# Patient Record
Sex: FEMALE | Race: WHITE | NOT HISPANIC OR LATINO | Employment: FULL TIME | URBAN - METROPOLITAN AREA
[De-identification: names, ages, dates, MRNs, and addresses within clinical notes are randomized per-mention and may not be internally consistent; named-entity substitution may affect disease eponyms.]

---

## 2023-05-05 LAB
ESTRADIOL (PG/ML) IN SER/PLAS: 70 PG/ML
FOLLITROPIN (IU/L) IN SER/PLAS: 22.6 IU/L
LUTEINIZING HORMONE (IU/ML) IN SER/PLAS: 15.8 IU/L

## 2023-11-07 LAB
GENETICS TEST NAME-DATA CONVERSION: NORMAL
LAB MOLECULAR CA TECHNICAL NOTES: NORMAL

## 2024-01-19 ENCOUNTER — OFFICE VISIT (OUTPATIENT)
Dept: OBSTETRICS AND GYNECOLOGY | Facility: CLINIC | Age: 44
End: 2024-01-19
Payer: COMMERCIAL

## 2024-01-19 VITALS
BODY MASS INDEX: 28.89 KG/M2 | SYSTOLIC BLOOD PRESSURE: 130 MMHG | WEIGHT: 157 LBS | DIASTOLIC BLOOD PRESSURE: 90 MMHG | HEIGHT: 62 IN

## 2024-01-19 DIAGNOSIS — N90.4 LICHEN SCLEROSUS OF VULVA: ICD-10-CM

## 2024-01-19 DIAGNOSIS — N89.8 VAGINAL DISCHARGE: ICD-10-CM

## 2024-01-19 DIAGNOSIS — Z11.3 SCREEN FOR STD (SEXUALLY TRANSMITTED DISEASE): ICD-10-CM

## 2024-01-19 DIAGNOSIS — N76.4 ABSCESS, VULVA: Primary | ICD-10-CM

## 2024-01-19 DIAGNOSIS — A74.9 CHLAMYDIA INFECTION: ICD-10-CM

## 2024-01-19 PROCEDURE — 87591 N.GONORRHOEAE DNA AMP PROB: CPT

## 2024-01-19 PROCEDURE — 87075 CULTR BACTERIA EXCEPT BLOOD: CPT

## 2024-01-19 PROCEDURE — 1036F TOBACCO NON-USER: CPT | Performed by: OBSTETRICS & GYNECOLOGY

## 2024-01-19 PROCEDURE — 87070 CULTURE OTHR SPECIMN AEROBIC: CPT

## 2024-01-19 PROCEDURE — 87205 SMEAR GRAM STAIN: CPT

## 2024-01-19 PROCEDURE — 87185 SC STD ENZYME DETCJ PER NZM: CPT

## 2024-01-19 PROCEDURE — 99214 OFFICE O/P EST MOD 30 MIN: CPT | Performed by: OBSTETRICS & GYNECOLOGY

## 2024-01-19 PROCEDURE — 87661 TRICHOMONAS VAGINALIS AMPLIF: CPT

## 2024-01-19 PROCEDURE — 87491 CHLMYD TRACH DNA AMP PROBE: CPT

## 2024-01-19 RX ORDER — GABAPENTIN 300 MG/1
300 CAPSULE ORAL NIGHTLY
COMMUNITY

## 2024-01-19 RX ORDER — DOXYCYCLINE 100 MG/1
100 CAPSULE ORAL 2 TIMES DAILY
Qty: 14 CAPSULE | Refills: 0 | Status: SHIPPED | OUTPATIENT
Start: 2024-01-19 | End: 2024-01-26

## 2024-01-19 RX ORDER — VENLAFAXINE HYDROCHLORIDE 37.5 MG/1
37.5 CAPSULE, EXTENDED RELEASE ORAL
COMMUNITY
Start: 2022-09-26

## 2024-01-19 RX ORDER — CLOBETASOL PROPIONATE 0.5 MG/G
CREAM TOPICAL
COMMUNITY
Start: 2021-08-13

## 2024-01-19 RX ORDER — CLONAZEPAM 0.5 MG/1
0.5 TABLET ORAL EVERY 12 HOURS PRN
COMMUNITY
Start: 2017-07-05

## 2024-01-19 NOTE — PROGRESS NOTES
"SUBJECTIVE  Rita Hayes is a 43 y.o. female here for Gyn problem visit  C/o discharge from the clitoris for 2 weeks, also c/o pain in the area for 3 days  H/o endometrial ablation, stopped periods  Has H/o lichen sclerosus, using clobetasol sparingly    Gyn:   Menstrual Pattern: none  STIs: denies  Sexual Activity: men, monogamous, no complaints  Contraception: Tubal Sterilization    [unfilled]  No past medical history on file.   Past Surgical History:   Procedure Laterality Date    OTHER SURGICAL HISTORY  11/18/2019    Endometrial ablation    OTHER SURGICAL HISTORY  08/13/2021    [place] tooth extraction    OTHER SURGICAL HISTORY  08/13/2021    Oophorectomy    TUBAL LIGATION  10/27/2015    Tubal Ligation        OBJECTIVE  /90   Ht 1.575 m (5' 2\")   Wt 71.2 kg (157 lb)   BMI 28.72 kg/m²      General:   Alert and oriented, in no acute distress   Neck: Supple. No visible thyromegaly.    Breast/Axilla: Normal to palpation bilaterally without masses, skin changes, or nipple discharge.    Abdomen: Soft, non-tender, without masses or organomegaly   Vulva: Normal architecture without erythema, masses, or lesions.    Vagina: Normal mucosa without lesions, masses, or atrophy. No abnormal vaginal discharge.    Cervix: Normal without masses, lesions, or signs of cervicitis.    Uterus: Normal mobile, non-enlarged uterus    Adnexa: Normal without masses or lesions   Pelvic Floor No POP noted. No high tone pelvic floor    Psych Normal affect. Normal mood.      Problem List Items Addressed This Visit    None     IMPRESSIONS:  Vulvar abscess, spontaneously draining, cultures ordered  Start doxycycline , erx done  Continue clobetasol   Schedule annual exam.    There are no diagnoses linked to this encounter.     Uyen Haines MD  "

## 2024-01-20 LAB
C TRACH RRNA SPEC QL NAA+PROBE: POSITIVE
N GONORRHOEA DNA SPEC QL PROBE+SIG AMP: NEGATIVE
T VAGINALIS RRNA SPEC QL NAA+PROBE: NEGATIVE

## 2024-01-21 LAB
CLUE CELLS VAG LPF-#/AREA: NORMAL /[LPF]
NUGENT SCORE: 1
YEAST VAG WET PREP-#/AREA: NORMAL

## 2024-01-22 ENCOUNTER — TELEPHONE (OUTPATIENT)
Dept: OBSTETRICS AND GYNECOLOGY | Facility: CLINIC | Age: 44
End: 2024-01-22
Payer: COMMERCIAL

## 2024-01-22 DIAGNOSIS — A74.9 CHLAMYDIA INFECTION: ICD-10-CM

## 2024-01-22 LAB
B-LACTAMASE ORGANISM ISLT: POSITIVE
BACTERIA SPEC CULT: NORMAL
BACTERIA SPEC CULT: NORMAL
GRAM STN SPEC: NORMAL
GRAM STN SPEC: NORMAL

## 2024-01-22 NOTE — TELEPHONE ENCOUNTER
Dr. Haines prescribed doxycycline 100MG for patient, she states she is unable to keep it down, she is asking if a new medication can be sent over to [facility]?

## 2024-01-23 ENCOUNTER — TELEPHONE (OUTPATIENT)
Dept: OBSTETRICS AND GYNECOLOGY | Facility: CLINIC | Age: 44
End: 2024-01-23
Payer: COMMERCIAL

## 2024-01-23 DIAGNOSIS — A74.9 CHLAMYDIA INFECTION: ICD-10-CM

## 2024-01-23 RX ORDER — AZITHROMYCIN 500 MG/1
1000 TABLET, FILM COATED ORAL ONCE
Qty: 2 TABLET | Refills: 0 | Status: SHIPPED | OUTPATIENT
Start: 2024-01-23 | End: 2024-01-23

## 2024-01-23 NOTE — TELEPHONE ENCOUNTER
----- Message from Uyen Haines MD sent at 1/22/2024  4:57 PM EST -----  Please send in zithromax 1000 mg x single dose, partner needs treated  ----- Message -----  From: Lab, Background User  Sent: 1/20/2024  12:02 PM EST  To: Uyen Haines MD

## 2024-01-23 NOTE — TELEPHONE ENCOUNTER
Called into pharmacy. Patient called and notified. Patient will call office tomorrow to schedule 6 week DIOGENES appointment.

## 2024-01-31 ENCOUNTER — TELEPHONE (OUTPATIENT)
Dept: OBSTETRICS AND GYNECOLOGY | Facility: CLINIC | Age: 44
End: 2024-01-31
Payer: COMMERCIAL

## 2024-01-31 NOTE — TELEPHONE ENCOUNTER
Called patient and advised that Dr. Haines would want to do a 6 week test of cure. Also advised to refrain from intercourse until 1 week after patient and partner have been fully treated. Patient is going to check with PCP for an appointment and/or will call back to OBGYN office for an appointment

## 2024-01-31 NOTE — TELEPHONE ENCOUNTER
Patient had STD testing 2 weeks ago and was given a Z-alessandro. She wants retested before she has intercourse again.  Please advise.  She is very confused about all of this.      She is working if you want to leave a message it is totally fine.

## 2024-02-12 ENCOUNTER — OFFICE VISIT (OUTPATIENT)
Dept: OBSTETRICS AND GYNECOLOGY | Facility: CLINIC | Age: 44
End: 2024-02-12
Payer: COMMERCIAL

## 2024-02-12 ENCOUNTER — TELEPHONE (OUTPATIENT)
Dept: OBSTETRICS AND GYNECOLOGY | Facility: CLINIC | Age: 44
End: 2024-02-12
Payer: COMMERCIAL

## 2024-02-12 VITALS — SYSTOLIC BLOOD PRESSURE: 118 MMHG | BODY MASS INDEX: 28.53 KG/M2 | WEIGHT: 156 LBS | DIASTOLIC BLOOD PRESSURE: 84 MMHG

## 2024-02-12 DIAGNOSIS — N76.4 ABSCESS, VULVA: Primary | ICD-10-CM

## 2024-02-12 DIAGNOSIS — N90.4 LICHEN SCLEROSUS OF VULVA: ICD-10-CM

## 2024-02-12 DIAGNOSIS — Z11.3 SCREEN FOR STD (SEXUALLY TRANSMITTED DISEASE): ICD-10-CM

## 2024-02-12 DIAGNOSIS — N89.8 VAGINAL DISCHARGE: ICD-10-CM

## 2024-02-12 PROCEDURE — 87591 N.GONORRHOEAE DNA AMP PROB: CPT

## 2024-02-12 PROCEDURE — 87491 CHLMYD TRACH DNA AMP PROBE: CPT

## 2024-02-12 PROCEDURE — 1036F TOBACCO NON-USER: CPT | Performed by: OBSTETRICS & GYNECOLOGY

## 2024-02-12 PROCEDURE — 99214 OFFICE O/P EST MOD 30 MIN: CPT | Performed by: OBSTETRICS & GYNECOLOGY

## 2024-02-12 PROCEDURE — 87070 CULTURE OTHR SPECIMN AEROBIC: CPT

## 2024-02-12 PROCEDURE — 87661 TRICHOMONAS VAGINALIS AMPLIF: CPT

## 2024-02-12 RX ORDER — DOXYCYCLINE 100 MG/1
100 CAPSULE ORAL 2 TIMES DAILY
Qty: 28 CAPSULE | Refills: 0 | Status: SHIPPED | OUTPATIENT
Start: 2024-02-12 | End: 2024-02-26

## 2024-02-12 RX ORDER — PHENTERMINE HYDROCHLORIDE 37.5 MG/1
37.5 TABLET ORAL
COMMUNITY
Start: 2024-01-23 | End: 2024-03-25

## 2024-02-12 NOTE — TELEPHONE ENCOUNTER
Patient called wanting another apptointment because she is having the same thing she had when she was here the end of January.  She had a abscess on the outside of her vagina and TB drained it.  She now has another one in the same exact spot and it is bleeding.  Please advise.    Please leave a message on her phone if no answer.

## 2024-02-12 NOTE — TELEPHONE ENCOUNTER
Spoke with patient and patient agrees to same day appointment today. Will be added to schedule today at 1:40.

## 2024-02-12 NOTE — PROGRESS NOTES
SUBJECTIVE  Rita Hayes is a 43 y.o. female here for gyn problem visit  She was treated for vulvar chlamydia infection one month ago  She c/o discharge and bleeding from the same area from an opening above her clitoris  Gyn:   Menstrual Pattern:   STIs: denies  Sexual Activity: men, monogamous, no complaints  Contraception: None    [unfilled]  No past medical history on file.   Past Surgical History:   Procedure Laterality Date    OTHER SURGICAL HISTORY  11/18/2019    Endometrial ablation    OTHER SURGICAL HISTORY  08/13/2021    [place] tooth extraction    OTHER SURGICAL HISTORY  08/13/2021    Oophorectomy    TUBAL LIGATION  10/27/2015    Tubal Ligation        OBJECTIVE  /84   Wt 70.8 kg (156 lb)   BMI 28.53 kg/m²      General:   Alert and oriented, in no acute distress   Neck: Supple. No visible thyromegaly.    Breast/Axilla: Normal to palpation bilaterally without masses, skin changes, or nipple discharge.    Abdomen: Soft, non-tender, without masses or organomegaly   Vulva: Normal architecture without erythema, labial agglutination noted in the clitoral area, with pus discharge from area 1 cm above the clitoris,    Vagina: Normal mucosa without lesions, masses, or atrophy. No abnormal vaginal discharge.    Cervix: Normal without masses, lesions, or signs of cervicitis.    Uterus: Normal mobile, non-enlarged uterus    Adnexa: Normal without masses or lesions   Pelvic Floor No POP noted. No high tone pelvic floor    Psych Normal affect. Normal mood.      Problem List Items Addressed This Visit       Abscess, vulva - Primary    Relevant Medications    doxycycline (Vibramycin) 100 mg capsule    Other Relevant Orders    Genital Culture    Lichen sclerosus of vulva    Vaginal discharge     Other Visit Diagnoses       Screen for STD (sexually transmitted disease)        Relevant Orders    Chlamydia Trachomatis, Amplified    Neisseria gonorrhoeae, Amplified    Trichomonas vaginalis, Nucleic Acid Detection            IMPRESSIONS:  Recurrent Vulvar abscess  Culture and sensitivity ordered again  Cervical GC/CT/trichomonas ordered  Will treat with doxycycline 100 mg BID x 14 days  Discussed that if there is no relief after antibiotic therapy, with perform vulvar biopsy to r/o neoplasia  F/u in 4 weeks        Uyen Haines MD

## 2024-02-13 LAB
C TRACH RRNA SPEC QL NAA+PROBE: NEGATIVE
N GONORRHOEA DNA SPEC QL PROBE+SIG AMP: NEGATIVE
T VAGINALIS RRNA SPEC QL NAA+PROBE: NEGATIVE

## 2024-02-16 LAB — BACTERIA GENITAL AEROBE CULT: NORMAL

## 2024-03-22 NOTE — PROGRESS NOTES
SUBJECTIVE  Rita Hayes is a 43 y.o. female here for vulvar biopsy  History of vulvar chlamydia infection, which has resolved now  She also has a history of vulvar lichen sclerosus with labial agglutination, and chronic discharge from small opening above the clitoral area.   She is using now clobetasol daily     Menstrual Pattern:   STIs: denies  Sexual Activity: men, monogamous, no complaints  Contraception: None    [unfilled]  No past medical history on file.   Past Surgical History:   Procedure Laterality Date    OTHER SURGICAL HISTORY  11/18/2019    Endometrial ablation    OTHER SURGICAL HISTORY  08/13/2021    [place] tooth extraction    OTHER SURGICAL HISTORY  08/13/2021    Oophorectomy    TUBAL LIGATION  10/27/2015    Tubal Ligation        OBJECTIVE  /90   Wt 68 kg (150 lb)   Breastfeeding No   BMI 27.44 kg/m²      General:   Alert and oriented, in no acute distress   Neck: Supple. No visible thyromegaly.    Vulva: Atrophic labia majora and minora, with labial agglutination and clitoris buried within. At cranial end of labial agglutination, about 1 cm above the clitoris , small pin - point opening still noted with small amount of discharge.   Pelvic Floor No POP noted. No high tone pelvic floor    Psych Normal affect. Normal mood.      Problem List Items Addressed This Visit       Abscess, vulva    Relevant Orders    Surgical Pathology Exam    Lichen sclerosus of vulva    Relevant Orders    Surgical Pathology Exam    Vulvar atrophy - Primary      IMPRESSIONS:  Patient presented for vulvar biopsy due to chronic labial abscess 1 cm above the clitoral area.  Vulvar biopsy was completed, and silver nitrate and Monsel's paste was applied for hemostasis.  After completion of the procedure patient complained of dizziness and nausea and had a syncopal episode, with continuing dizziness and disorientation.  EMS was called and patient was sent to [place] ER for further evaluation  Procedure note to  follow        Uyen Haines MD

## 2024-03-25 ENCOUNTER — APPOINTMENT (OUTPATIENT)
Dept: CARDIOLOGY | Facility: HOSPITAL | Age: 44
End: 2024-03-25
Payer: COMMERCIAL

## 2024-03-25 ENCOUNTER — APPOINTMENT (OUTPATIENT)
Dept: RADIOLOGY | Facility: HOSPITAL | Age: 44
End: 2024-03-25
Payer: COMMERCIAL

## 2024-03-25 ENCOUNTER — HOSPITAL ENCOUNTER (EMERGENCY)
Facility: HOSPITAL | Age: 44
Discharge: HOME | End: 2024-03-25
Payer: COMMERCIAL

## 2024-03-25 ENCOUNTER — OFFICE VISIT (OUTPATIENT)
Dept: OBSTETRICS AND GYNECOLOGY | Facility: CLINIC | Age: 44
End: 2024-03-25
Payer: COMMERCIAL

## 2024-03-25 VITALS
DIASTOLIC BLOOD PRESSURE: 86 MMHG | OXYGEN SATURATION: 98 % | HEART RATE: 86 BPM | TEMPERATURE: 98.4 F | HEIGHT: 66 IN | RESPIRATION RATE: 18 BRPM | WEIGHT: 149.91 LBS | SYSTOLIC BLOOD PRESSURE: 131 MMHG | BODY MASS INDEX: 24.09 KG/M2

## 2024-03-25 VITALS — SYSTOLIC BLOOD PRESSURE: 130 MMHG | WEIGHT: 150 LBS | DIASTOLIC BLOOD PRESSURE: 90 MMHG | BODY MASS INDEX: 27.44 KG/M2

## 2024-03-25 DIAGNOSIS — R55 SYNCOPE, UNSPECIFIED SYNCOPE TYPE: Primary | ICD-10-CM

## 2024-03-25 DIAGNOSIS — N90.4 LICHEN SCLEROSUS OF VULVA: ICD-10-CM

## 2024-03-25 DIAGNOSIS — N90.5 VULVAR ATROPHY: Primary | ICD-10-CM

## 2024-03-25 DIAGNOSIS — N76.4 ABSCESS, VULVA: ICD-10-CM

## 2024-03-25 PROBLEM — N89.8 VAGINAL DISCHARGE: Status: RESOLVED | Noted: 2024-02-12 | Resolved: 2024-03-25

## 2024-03-25 LAB
ALBUMIN SERPL BCP-MCNC: 4.2 G/DL (ref 3.4–5)
ALP SERPL-CCNC: 64 U/L (ref 33–110)
ALT SERPL W P-5'-P-CCNC: 10 U/L (ref 7–45)
ANION GAP SERPL CALC-SCNC: 11 MMOL/L (ref 10–20)
APPEARANCE UR: CLEAR
AST SERPL W P-5'-P-CCNC: 14 U/L (ref 9–39)
BACTERIA #/AREA URNS AUTO: ABNORMAL /HPF
BASOPHILS # BLD AUTO: 0.07 X10*3/UL (ref 0–0.1)
BASOPHILS NFR BLD AUTO: 0.9 %
BILIRUB SERPL-MCNC: 0.7 MG/DL (ref 0–1.2)
BILIRUB UR STRIP.AUTO-MCNC: NEGATIVE MG/DL
BUN SERPL-MCNC: 10 MG/DL (ref 6–23)
CALCIUM SERPL-MCNC: 9.3 MG/DL (ref 8.6–10.3)
CARDIAC TROPONIN I PNL SERPL HS: 3 NG/L (ref 0–13)
CHLORIDE SERPL-SCNC: 104 MMOL/L (ref 98–107)
CO2 SERPL-SCNC: 27 MMOL/L (ref 21–32)
COLOR UR: ABNORMAL
CREAT SERPL-MCNC: 0.76 MG/DL (ref 0.5–1.05)
EGFRCR SERPLBLD CKD-EPI 2021: >90 ML/MIN/1.73M*2
EOSINOPHIL # BLD AUTO: 0.15 X10*3/UL (ref 0–0.7)
EOSINOPHIL NFR BLD AUTO: 2 %
ERYTHROCYTE [DISTWIDTH] IN BLOOD BY AUTOMATED COUNT: 12.5 % (ref 11.5–14.5)
GLUCOSE SERPL-MCNC: 88 MG/DL (ref 74–99)
GLUCOSE UR STRIP.AUTO-MCNC: NEGATIVE MG/DL
HCT VFR BLD AUTO: 42.5 % (ref 36–46)
HGB BLD-MCNC: 13.8 G/DL (ref 12–16)
IMM GRANULOCYTES # BLD AUTO: 0.02 X10*3/UL (ref 0–0.7)
IMM GRANULOCYTES NFR BLD AUTO: 0.3 % (ref 0–0.9)
KETONES UR STRIP.AUTO-MCNC: ABNORMAL MG/DL
LEUKOCYTE ESTERASE UR QL STRIP.AUTO: NEGATIVE
LYMPHOCYTES # BLD AUTO: 2.02 X10*3/UL (ref 1.2–4.8)
LYMPHOCYTES NFR BLD AUTO: 26.6 %
MCH RBC QN AUTO: 25.9 PG (ref 26–34)
MCHC RBC AUTO-ENTMCNC: 32.5 G/DL (ref 32–36)
MCV RBC AUTO: 80 FL (ref 80–100)
MONOCYTES # BLD AUTO: 0.39 X10*3/UL (ref 0.1–1)
MONOCYTES NFR BLD AUTO: 5.1 %
NEUTROPHILS # BLD AUTO: 4.93 X10*3/UL (ref 1.2–7.7)
NEUTROPHILS NFR BLD AUTO: 65.1 %
NITRITE UR QL STRIP.AUTO: NEGATIVE
NRBC BLD-RTO: 0 /100 WBCS (ref 0–0)
PH UR STRIP.AUTO: 7 [PH]
PLATELET # BLD AUTO: 285 X10*3/UL (ref 150–450)
POTASSIUM SERPL-SCNC: 3.8 MMOL/L (ref 3.5–5.3)
PROT SERPL-MCNC: 7.1 G/DL (ref 6.4–8.2)
PROT UR STRIP.AUTO-MCNC: NEGATIVE MG/DL
RBC # BLD AUTO: 5.33 X10*6/UL (ref 4–5.2)
RBC # UR STRIP.AUTO: ABNORMAL /UL
RBC #/AREA URNS AUTO: ABNORMAL /HPF
SODIUM SERPL-SCNC: 138 MMOL/L (ref 136–145)
SP GR UR STRIP.AUTO: 1
SQUAMOUS #/AREA URNS AUTO: ABNORMAL /HPF
UROBILINOGEN UR STRIP.AUTO-MCNC: <2 MG/DL
WBC # BLD AUTO: 7.6 X10*3/UL (ref 4.4–11.3)
WBC #/AREA URNS AUTO: ABNORMAL /HPF

## 2024-03-25 PROCEDURE — 71046 X-RAY EXAM CHEST 2 VIEWS: CPT

## 2024-03-25 PROCEDURE — 96361 HYDRATE IV INFUSION ADD-ON: CPT

## 2024-03-25 PROCEDURE — 85025 COMPLETE CBC W/AUTO DIFF WBC: CPT | Performed by: NURSE PRACTITIONER

## 2024-03-25 PROCEDURE — 71046 X-RAY EXAM CHEST 2 VIEWS: CPT | Performed by: RADIOLOGY

## 2024-03-25 PROCEDURE — 56605 BIOPSY OF VULVA/PERINEUM: CPT | Performed by: OBSTETRICS & GYNECOLOGY

## 2024-03-25 PROCEDURE — 36415 COLL VENOUS BLD VENIPUNCTURE: CPT | Performed by: NURSE PRACTITIONER

## 2024-03-25 PROCEDURE — 2500000001 HC RX 250 WO HCPCS SELF ADMINISTERED DRUGS (ALT 637 FOR MEDICARE OP): Performed by: NURSE PRACTITIONER

## 2024-03-25 PROCEDURE — 88305 TISSUE EXAM BY PATHOLOGIST: CPT

## 2024-03-25 PROCEDURE — 93005 ELECTROCARDIOGRAM TRACING: CPT

## 2024-03-25 PROCEDURE — 2500000004 HC RX 250 GENERAL PHARMACY W/ HCPCS (ALT 636 FOR OP/ED): Performed by: NURSE PRACTITIONER

## 2024-03-25 PROCEDURE — 96374 THER/PROPH/DIAG INJ IV PUSH: CPT

## 2024-03-25 PROCEDURE — 56606 BIOPSY OF VULVA/PERINEUM: CPT | Performed by: OBSTETRICS & GYNECOLOGY

## 2024-03-25 PROCEDURE — 80053 COMPREHEN METABOLIC PANEL: CPT | Performed by: NURSE PRACTITIONER

## 2024-03-25 PROCEDURE — 88305 TISSUE EXAM BY PATHOLOGIST: CPT | Performed by: STUDENT IN AN ORGANIZED HEALTH CARE EDUCATION/TRAINING PROGRAM

## 2024-03-25 PROCEDURE — 99214 OFFICE O/P EST MOD 30 MIN: CPT | Performed by: OBSTETRICS & GYNECOLOGY

## 2024-03-25 PROCEDURE — 84484 ASSAY OF TROPONIN QUANT: CPT | Performed by: NURSE PRACTITIONER

## 2024-03-25 PROCEDURE — 1036F TOBACCO NON-USER: CPT | Performed by: OBSTETRICS & GYNECOLOGY

## 2024-03-25 PROCEDURE — 81001 URINALYSIS AUTO W/SCOPE: CPT | Performed by: NURSE PRACTITIONER

## 2024-03-25 PROCEDURE — 99284 EMERGENCY DEPT VISIT MOD MDM: CPT | Mod: 25

## 2024-03-25 RX ORDER — KETOROLAC TROMETHAMINE 30 MG/ML
15 INJECTION, SOLUTION INTRAMUSCULAR; INTRAVENOUS ONCE
Status: COMPLETED | OUTPATIENT
Start: 2024-03-25 | End: 2024-03-25

## 2024-03-25 RX ORDER — OXYCODONE AND ACETAMINOPHEN 5; 325 MG/1; MG/1
1 TABLET ORAL ONCE
Status: COMPLETED | OUTPATIENT
Start: 2024-03-25 | End: 2024-03-25

## 2024-03-25 RX ADMIN — SODIUM CHLORIDE 1000 ML: 9 INJECTION, SOLUTION INTRAVENOUS at 13:03

## 2024-03-25 RX ADMIN — OXYCODONE HYDROCHLORIDE AND ACETAMINOPHEN 1 TABLET: 5; 325 TABLET ORAL at 14:37

## 2024-03-25 RX ADMIN — KETOROLAC TROMETHAMINE 15 MG: 30 INJECTION, SOLUTION INTRAMUSCULAR; INTRAVENOUS at 13:04

## 2024-03-25 ASSESSMENT — PAIN DESCRIPTION - LOCATION: LOCATION: VAGINA

## 2024-03-25 ASSESSMENT — LIFESTYLE VARIABLES
HAVE YOU EVER FELT YOU SHOULD CUT DOWN ON YOUR DRINKING: NO
EVER HAD A DRINK FIRST THING IN THE MORNING TO STEADY YOUR NERVES TO GET RID OF A HANGOVER: NO
HAVE PEOPLE ANNOYED YOU BY CRITICIZING YOUR DRINKING: NO
TOTAL SCORE: 0
EVER FELT BAD OR GUILTY ABOUT YOUR DRINKING: NO

## 2024-03-25 ASSESSMENT — PAIN SCALES - GENERAL
PAINLEVEL_OUTOF10: 10 - WORST POSSIBLE PAIN
PAINLEVEL_OUTOF10: 3

## 2024-03-25 ASSESSMENT — PAIN - FUNCTIONAL ASSESSMENT: PAIN_FUNCTIONAL_ASSESSMENT: 0-10

## 2024-03-25 ASSESSMENT — COLUMBIA-SUICIDE SEVERITY RATING SCALE - C-SSRS
1. IN THE PAST MONTH, HAVE YOU WISHED YOU WERE DEAD OR WISHED YOU COULD GO TO SLEEP AND NOT WAKE UP?: NO
6. HAVE YOU EVER DONE ANYTHING, STARTED TO DO ANYTHING, OR PREPARED TO DO ANYTHING TO END YOUR LIFE?: NO
2. HAVE YOU ACTUALLY HAD ANY THOUGHTS OF KILLING YOURSELF?: NO

## 2024-03-25 ASSESSMENT — PAIN DESCRIPTION - PAIN TYPE: TYPE: ACUTE PAIN

## 2024-03-25 NOTE — PROGRESS NOTES
Patient ID: Rita Hayes is a 43 y.o. female, here for vulvar biopsy    Biopsy vulva    Date/Time: 3/25/2024 12:47 PM    Performed by: Uyen Haines MD  Authorized by: Uyen Haines MD    Consent:     Consent obtained:  Written    Consent given by:  Patient    Risks, benefits, and alternatives were discussed: yes      Risks discussed:  Bleeding, infection and pain    Alternatives discussed:  Observation and referral  Universal protocol:     Procedure explained and questions answered to patient or proxy's satisfaction: yes      Relevant documents present and verified: yes      Site/side marked: yes      Immediately prior to procedure, a time out was called: yes      Patient identity confirmed:  Verbally with patient  Indications:     Indications:  History of lichen sclerosus with labial agglutination and chronic vulvar abscess  Pre-procedure details:     Skin preparation:  Povidone-iodine    Preparation: Patient was prepped and draped in the usual sterile fashion    Sedation:     Sedation type:  None  Anesthesia:     Anesthesia method:  Local infiltration    Local anesthetic:  Lidocaine 1% w/o epi  Procedure specific details:      Patient was placed in dorsolithotomy position, prepped and draped in the usual sterile fashion and 3 mL of 1% lidocaine without epinephrine was infiltrated into the area 0.5 cm above the clitoris and below the opening of the chronic labial abscess area.  Scalpel was used to make a small vertical incision just below the area of abscess and just over the area of agglutination, to expose the buried area.  Next using 3 mm punch biopsy tool, biopsy was performed and sent to pathology.  Please bleeding was noted from the area.  Silver nitrate was applied for hemostasis but this was very poorly tolerated by patient.  Next Monsel's Paste was applied on the area for hemostasis.  Post-procedure details:     Procedure completion:  Tolerated with difficulty  After application of Monsel's paste  patient complained of lightheadedness and dizziness and disorientation and had a syncopal episode.  She was laid down and vitals were checked and were cold wash cloth was applied to her forehead.  Patient also complained of nausea and it was turned to the side in case of emesis.  Patient was monitored for 10 to 15 minutes but remained dizzy and disoriented.  At this point of time EMS was called to transfer patient to ER for further evaluation.

## 2024-03-25 NOTE — ED PROVIDER NOTES
"HPI   Chief Complaint   Patient presents with    Syncope       This is a 43-year-old  female with a history of anxiety, depression, and lichen sclerosus, that is presenting to the emergency room status post syncopal event.  The patient reports that she was having a biopsy performed by Dr. Haines for Lichen sclerosus.  She reports that her genitals were anesthetized but she can still feel the entire procedure.  They performed a biopsy near her clitoris.  She reports that she applied something to that area because it was \"very vascular and prevent bleeding \".  The patient reported that she felt intense pain in the last thing she remembers was screaming.  She woke up with EMS.  She reports that she still having a lot of pain.  She rates it a 10 out of 10 on the pain scale.  The patient denies any cardiac history or prior syncope.  She denies any chest pain, shortness of breath, dizziness, palpitations, paresthesias, headache, or focal weakness.  She not having abdominal pain, nausea, or vomiting.  The patient reports that she is on venlafaxine and phentermine routinely.  She denies any blood thinners.  She has no other medical complaints.      History provided by:  Patient   used: No                        Alfredo Coma Scale Score: 15                     Patient History   No past medical history on file.  Past Surgical History:   Procedure Laterality Date    OTHER SURGICAL HISTORY  11/18/2019    Endometrial ablation    OTHER SURGICAL HISTORY  08/13/2021    [place] tooth extraction    OTHER SURGICAL HISTORY  08/13/2021    Oophorectomy    TUBAL LIGATION  10/27/2015    Tubal Ligation     Family History   Problem Relation Name Age of Onset    Hypertension Mother      Heart disease Mother      Uterine cancer Sister      Ovarian cancer Maternal Grandmother       Social History     Tobacco Use    Smoking status: Former     Types: Cigarettes    Smokeless tobacco: Never   Vaping Use    Vaping Use: " Former   Substance Use Topics    Alcohol use: Yes     Comment: social    Drug use: Never       Physical Exam   ED Triage Vitals [03/25/24 1057]   Temperature Heart Rate Respirations BP   36.9 °C (98.4 °F) 68 20 144/75      Pulse Ox Temp src Heart Rate Source Patient Position   100 % -- Monitor Lying      BP Location FiO2 (%)     -- --       Physical Exam    ED Course & [facility]   ED Course as of 03/27/24 2018   Mon Mar 25, 2024   1330 ECG 12 lead  Twelve-lead EKG interpreted by me.  Sinus rhythm at a rate of 77.  WA interval is 139, QRS is 96, QT is 366, QTc is 415.  Normal axis.  Normal interval.  No acute ischemia or injury pattern noted. [CT]      ED Course User Index  [CT] LESLY Ortiz-CNP         Diagnoses as of 03/27/24 2018   Syncope, unspecified syncope type       Medical Decision Making  The patient was seen and evaluated by the nurse practitioner, Jania Dallas.  Old records were obtained and reviewed.  It was noted that the patient was seen for chronic labial abscess.  Incision and drainage was performed.  Silver nitrate and Monsel's paste was applied to obtain hemostasis.  Once the procedure was completed the patient had the syncopal episode.  She was sent to the emergency room by Dr. Haines due to continued confusion and disorientation.  The patient was alert and oriented on assessment.  The patient was placed on cardiac monitor and continuous pulse oximetry.  Orthostatic blood pressures were obtained and were unremarkable.  A saline lock was established and laboratory studies were drawn with results as noted.  The patient was administered 1 L of normal saline wide open for hydration and Toradol 15 mg IVP.  The patient did not report any improvement of her pain symptoms.  She was further medicated with 1 Percocet tab p.o. in the emergency room.  The patient's laboratory studies were largely unremarkable.  The patient did not have any acute leukocytosis.  She had normal renal and liver function.  Troponin was negative.  Routine urinalysis did not show any signs of infection.  We consulted with Dr. Haines and she was agreeable with discharge home based on the patient's unremarkable workup.  We believe that the patient syncopized most likely due to pain.  The patient was notified of her laboratory results.  The patient is feeling improvement.  She is agreeable with discharge home.  The patient is to return if worse in any way.  She was given strict return precautions the patient was discharged in stable condition with computer discharge instructions given.        Procedure  Procedures     LESLY Ortiz-FLORENCE  03/27/24 2020     No...

## 2024-03-26 ENCOUNTER — TELEPHONE (OUTPATIENT)
Dept: OBSTETRICS AND GYNECOLOGY | Facility: CLINIC | Age: 44
End: 2024-03-26
Payer: COMMERCIAL

## 2024-03-26 LAB
ATRIAL RATE: 77 BPM
HOLD SPECIMEN: NORMAL
P AXIS: 58 DEGREES
PR INTERVAL: 139 MS
Q ONSET: 249 MS
QRS COUNT: 13 BEATS
QRS DURATION: 96 MS
QT INTERVAL: 366 MS
QTC CALCULATION(BAZETT): 415 MS
QTC FREDERICIA: 397 MS
R AXIS: 34 DEGREES
T AXIS: 24 DEGREES
T OFFSET: 432 MS
VENTRICULAR RATE: 77 BPM

## 2024-03-26 NOTE — TELEPHONE ENCOUNTER
Returned patient call. Patient instructed on vulvar biopsy after care including blotting dry for the first 24-48 hours and watching for signs and symptoms of infection such as fever, chills, and discharge.

## 2024-03-26 NOTE — TELEPHONE ENCOUNTER
Patient would like a call back to let her know if there is any after care she needs to do from yesterday. Please advise.

## 2024-04-03 LAB
LABORATORY COMMENT REPORT: NORMAL
PATH REPORT.COMMENTS IMP SPEC: NORMAL
PATH REPORT.FINAL DX SPEC: NORMAL
PATH REPORT.GROSS SPEC: NORMAL
PATH REPORT.TOTAL CANCER: NORMAL

## 2024-08-01 ENCOUNTER — APPOINTMENT (OUTPATIENT)
Dept: OBSTETRICS AND GYNECOLOGY | Facility: CLINIC | Age: 44
End: 2024-08-01
Payer: COMMERCIAL

## 2025-01-24 ENCOUNTER — OFFICE VISIT (OUTPATIENT)
Dept: URGENT CARE | Age: 45
End: 2025-01-24
Payer: COMMERCIAL

## 2025-01-24 VITALS
SYSTOLIC BLOOD PRESSURE: 124 MMHG | HEART RATE: 93 BPM | RESPIRATION RATE: 18 BRPM | OXYGEN SATURATION: 99 % | DIASTOLIC BLOOD PRESSURE: 86 MMHG | TEMPERATURE: 98.2 F

## 2025-01-24 DIAGNOSIS — R68.89 FLU-LIKE SYMPTOMS: ICD-10-CM

## 2025-01-24 DIAGNOSIS — H65.02 ACUTE SEROUS OTITIS MEDIA OF LEFT EAR, RECURRENCE NOT SPECIFIED: Primary | ICD-10-CM

## 2025-01-24 LAB
POC RAPID INFLUENZA A: NEGATIVE
POC RAPID INFLUENZA B: NEGATIVE
POC SARS-COV-2 AG BINAX: NORMAL

## 2025-01-24 RX ORDER — AMOXICILLIN AND CLAVULANATE POTASSIUM 875; 125 MG/1; MG/1
1 TABLET, FILM COATED ORAL 2 TIMES DAILY
Qty: 14 TABLET | Refills: 0 | Status: SHIPPED | OUTPATIENT
Start: 2025-01-24 | End: 2025-01-31

## 2025-01-24 ASSESSMENT — ENCOUNTER SYMPTOMS
SORE THROAT: 1
TROUBLE SWALLOWING: 0
MYALGIAS: 1
CHEST TIGHTNESS: 0
NAUSEA: 0
FEVER: 1
WHEEZING: 0
PALPITATIONS: 0
RHINORRHEA: 1
CONSTIPATION: 0
HEADACHES: 1
SHORTNESS OF BREATH: 0
DIARRHEA: 0
CHILLS: 1
COUGH: 1

## 2025-01-24 NOTE — PATIENT INSTRUCTIONS
Recommended Salt water gargles, hot tea and honey, tylenol/ibuprofen, nasal sprays, steam inhalation    Take antibiotic with food, do not take on empty stomach    Recommended probiotics, yogurt    Negative covid and flu testing in clinic    Alternate tylenol/ibuprofen every 3 hours  Tylenol: 500-1000 mg every 6 hours (do not exceed 4000 mg in 24 hour period)  Ibuprofen 400-600 mg every 6 hours.     If you develop chest pain, shortness of breath, fever not reduced with tylenol/ibuprofen go to ER.     Follow up with pcp.

## 2025-01-24 NOTE — PROGRESS NOTES
Subjective   Patient ID: Rita Hayes is a 44 y.o. female. They present today with a chief complaint of Cough (Pt advised that she has had a cough with fever, chills, myalgia, left earache, and a headache for the past 2 days.)    History of Present Illness  Patient presents with 2 days of left ear pain, fever, cough, chills, myalgias, headache and sinus pressure. Reports nasal congestion. Denies fever, chills, sweats. Denies n/v/d. Denies chest pain, sob.             Past Medical History  Allergies as of 01/24/2025    (No Known Allergies)       (Not in a hospital admission)       No past medical history on file.    Past Surgical History:   Procedure Laterality Date    OTHER SURGICAL HISTORY  11/18/2019    Endometrial ablation    OTHER SURGICAL HISTORY  08/13/2021    [place] tooth extraction    OTHER SURGICAL HISTORY  08/13/2021    Oophorectomy    TUBAL LIGATION  10/27/2015    Tubal Ligation        reports that she has quit smoking. Her smoking use included cigarettes. She has never used smokeless tobacco. She reports current alcohol use. She reports that she does not use drugs.    Review of Systems  Review of Systems   Constitutional:  Positive for chills and fever.   HENT:  Positive for congestion, postnasal drip, rhinorrhea and sore throat. Negative for ear pain and trouble swallowing.    Respiratory:  Positive for cough. Negative for chest tightness, shortness of breath and wheezing.    Cardiovascular:  Negative for chest pain and palpitations.   Gastrointestinal:  Negative for constipation, diarrhea and nausea.   Musculoskeletal:  Positive for myalgias.   Skin:  Negative for rash.   Neurological:  Positive for headaches.                                  Objective    Vitals:    01/24/25 1841   BP: 124/86   Pulse: 93   Resp: 18   Temp: 36.8 °C (98.2 °F)   SpO2: 99%     No LMP recorded. Patient has had an ablation.    Physical Exam  Constitutional:       General: She is not in acute distress.     Appearance: She  is not toxic-appearing.   HENT:      Right Ear: External ear normal. A middle ear effusion is present.      Left Ear: External ear normal. A middle ear effusion is present. Tympanic membrane is erythematous.      Nose: Congestion present.      Right Sinus: No frontal sinus tenderness.      Left Sinus: No frontal sinus tenderness.      Mouth/Throat:      Mouth: Mucous membranes are moist. No oral lesions.      Pharynx: Postnasal drip present. No oropharyngeal exudate or posterior oropharyngeal erythema.   Eyes:      Pupils: Pupils are equal, round, and reactive to light.   Cardiovascular:      Rate and Rhythm: Normal rate and regular rhythm.   Pulmonary:      Effort: Pulmonary effort is normal. No respiratory distress.      Breath sounds: Normal breath sounds. No wheezing.   Musculoskeletal:      Cervical back: Normal range of motion.   Neurological:      Mental Status: She is alert.         Procedures    Point of Care Test & Imaging Results from this visit  Results for orders placed or performed in visit on 01/24/25   POCT Covid-19 Rapid Antigen   Result Value Ref Range    POC ELIZA-COV-2 AG  Presumptive negative test for SARS-CoV-2 (no antigen detected)     Presumptive negative test for SARS-CoV-2 (no antigen detected)   POCT Influenza A/B manually resulted   Result Value Ref Range    POC Rapid Influenza A Negative Negative    POC Rapid Influenza B Negative Negative      No results found.    Diagnostic study results (if any) were reviewed by Brittnee Toscano PA-C.    Assessment/Plan   Allergies, medications, history, and pertinent labs/EKGs/Imaging reviewed by Brittnee Toscano PA-C.     Medical Decision Making  MDM- History and examination consistent with acute uncomplicated Otitis media secondary to Uri. No evidence of TM perforation, otitis externa, mastoiditis, or sepsis. No sign of pneumonia or strep. Negative covid and influenza testing in clinic. Counseled patient/family on treatment plan with supportive  measures and antibiotics. Return to clinic or present to ED if symptoms change or worsen. Otherwise follow-up with PCP. Patient/Parent verbalized understanding and agrees with plan.       Orders and Diagnoses  Diagnoses and all orders for this visit:  Acute serous otitis media of left ear, recurrence not specified  -     amoxicillin-pot clavulanate (Augmentin) 875-125 mg tablet; Take 1 tablet by mouth 2 times a day for 7 days. Take with food, drink plenty of water. Do not take on empty stomach.  Flu-like symptoms  -     POCT Covid-19 Rapid Antigen  -     POCT Influenza A/B manually resulted      Medical Admin Record      Patient disposition: Home    Electronically signed by Brittnee Toscano PA-C  6:47 PM

## 2025-01-24 NOTE — LETTER
January 24, 2025     Patient: Rita Hayes   YOB: 1980   Date of Visit: 1/24/2025       To Whom It May Concern:    Rita Hayes was seen in my clinic on 1/24/2025.  Please excuse Rita for her absence from work until symptoms improved and fever free for 24-48 hours.          Sincerely,         Brittnee Toscano PA-C        CC: No Recipients

## 2025-07-24 ENCOUNTER — APPOINTMENT (OUTPATIENT)
Dept: GASTROENTEROLOGY | Facility: CLINIC | Age: 45
End: 2025-07-24